# Patient Record
Sex: FEMALE | Race: ASIAN | NOT HISPANIC OR LATINO
[De-identification: names, ages, dates, MRNs, and addresses within clinical notes are randomized per-mention and may not be internally consistent; named-entity substitution may affect disease eponyms.]

---

## 2024-08-20 ENCOUNTER — APPOINTMENT (OUTPATIENT)
Dept: UROLOGY | Facility: CLINIC | Age: 49
End: 2024-08-20
Payer: MEDICAID

## 2024-08-20 VITALS
HEART RATE: 66 BPM | OXYGEN SATURATION: 99 % | BODY MASS INDEX: 22.99 KG/M2 | WEIGHT: 133 LBS | RESPIRATION RATE: 18 BRPM | TEMPERATURE: 97.7 F | SYSTOLIC BLOOD PRESSURE: 103 MMHG | HEIGHT: 63.78 IN | DIASTOLIC BLOOD PRESSURE: 68 MMHG

## 2024-08-20 DIAGNOSIS — Z00.00 ENCOUNTER FOR GENERAL ADULT MEDICAL EXAMINATION W/OUT ABNORMAL FINDINGS: ICD-10-CM

## 2024-08-20 DIAGNOSIS — R33.9 RETENTION OF URINE, UNSPECIFIED: ICD-10-CM

## 2024-08-20 DIAGNOSIS — Z78.9 OTHER SPECIFIED HEALTH STATUS: ICD-10-CM

## 2024-08-20 PROCEDURE — G2211 COMPLEX E/M VISIT ADD ON: CPT | Mod: NC

## 2024-08-20 PROCEDURE — 99204 OFFICE O/P NEW MOD 45 MIN: CPT

## 2024-08-20 RX ORDER — OXYBUTYNIN CHLORIDE 5 MG/1
5 TABLET, EXTENDED RELEASE ORAL
Qty: 30 | Refills: 3 | Status: ACTIVE | COMMUNITY
Start: 2024-08-20 | End: 1900-01-01

## 2024-08-20 NOTE — ADDENDUM
[FreeTextEntry1] : Entered by Ryan Sanford, acting as scribe for Dr. Sher Best. The documentation recorded by the scribe accurately reflects the service I personally performed and the decisions made by me.

## 2024-08-20 NOTE — LETTER BODY
[FreeTextEntry1] : Alonso Ortiz -[address], [place] [address] [phone]  Dear Dr. Ortiz,   Reason for visit: Urinary frequency.   This is a 49 year-old woman with urinary frequency and urgency referred for evaluation. Patient reports urinary frequency and urgency every 1-2 hours. She denies any gross hematuria, dysuria or urinary incontinence. The patient does not smoke. Her symptoms are aggravated by hydration. She has occasional nocturia. The patient denies any relieving factors. The patient denies any interference of function. The patient is entirely asymptomatic. She denies any history of glaucoma. All other review of systems are negative. She has no cancer in her family medical history. She has no previous surgical history. Past medical history, family history and social history were inquired and were noncontributory to current condition. The patient does not use tobacco or drink alcohol. Medications and allergies were reviewed. She has no known allergies to medication.   On examination, the patient is a well-appearing woman in no acute distress. She is alert and oriented and follows commands. She has normal mood and affect. She is normocephalic. Neck is supple. Respirations are unlabored. Abdomen is soft and nontender. Bladder is nonpalpable. No CVA tenderness. Neurologically she is grossly intact. No peripheral edema. Skin without gross abnormality.   ASSESSMENT: Urinary frequency.   I counseled the patient. I discussed the various etiologies of urinary frequency. I discussed the management options of the will to the patient. Given the moderate symptoms, I recommend that the patient begin a trial of Oxybutynin 5 mg. She denies any history of glaucoma. I discussed the potential side effects of the medication. I counseled the patient on its use and side effects. If the patient develops any side effects, the patient will discontinue the medication and contact me.   I recommended the patient obtain urinalysis and urine culture to look for infections. Risks and alternatives were discussed. I answered the patient's questions. The patient will follow-up as directed and will contact me with any questions or concerns. Thank you for the opportunity to participate in the care of Ms. TATE. I will keep you updated on her progress.   PLAN: Trial of Oxybutynin. Urinalysis. Urine culture. Follow up in one month.

## 2024-08-21 LAB
APPEARANCE: CLEAR
BACTERIA UR CULT: NORMAL
BACTERIA: NEGATIVE /HPF
BILIRUBIN URINE: NEGATIVE
BLOOD URINE: NEGATIVE
CAST: 0 /LPF
COLOR: YELLOW
EPITHELIAL CELLS: 1 /HPF
GLUCOSE QUALITATIVE U: NEGATIVE MG/DL
KETONES URINE: NEGATIVE MG/DL
LEUKOCYTE ESTERASE URINE: NEGATIVE
MICROSCOPIC-UA: NORMAL
NITRITE URINE: NEGATIVE
PH URINE: 5.5
PROTEIN URINE: NEGATIVE MG/DL
RED BLOOD CELLS URINE: 0 /HPF
SPECIFIC GRAVITY URINE: 1.02
UROBILINOGEN URINE: 0.2 MG/DL
WHITE BLOOD CELLS URINE: 0 /HPF

## 2024-09-23 ENCOUNTER — NON-APPOINTMENT (OUTPATIENT)
Age: 49
End: 2024-09-23

## 2024-09-24 ENCOUNTER — APPOINTMENT (OUTPATIENT)
Dept: UROLOGY | Facility: CLINIC | Age: 49
End: 2024-09-24
Payer: MEDICAID

## 2024-09-24 VITALS
OXYGEN SATURATION: 98 % | RESPIRATION RATE: 18 BRPM | SYSTOLIC BLOOD PRESSURE: 103 MMHG | DIASTOLIC BLOOD PRESSURE: 71 MMHG | WEIGHT: 135 LBS | BODY MASS INDEX: 23.33 KG/M2 | HEART RATE: 72 BPM | TEMPERATURE: 97.5 F

## 2024-09-24 DIAGNOSIS — R33.9 RETENTION OF URINE, UNSPECIFIED: ICD-10-CM

## 2024-09-24 DIAGNOSIS — R35.0 FREQUENCY OF MICTURITION: ICD-10-CM

## 2024-09-24 PROCEDURE — 99213 OFFICE O/P EST LOW 20 MIN: CPT

## 2024-09-24 PROCEDURE — G2211 COMPLEX E/M VISIT ADD ON: CPT | Mod: NC

## 2024-09-24 NOTE — HISTORY OF PRESENT ILLNESS
[FreeTextEntry1] : Follow-up for urinary frequency and urgency, trail Oxybutynin 5mg daily, reports unbale to empty bladder completely, PVR 63ml and 8ml after double voiding  Please refer to URO Consult Note.

## 2024-09-24 NOTE — LETTER BODY
[FreeTextEntry1] : Al[facility] Ortiz -[address], [place] [address] [phone]  Dear Dr. Ortiz,   Reason for visit: Urinary frequency. Mild right flank pain.  This is a 49 year-old woman with urinary frequency and urgency. Patient returns today for follow-up.  Patient reports taking Oxybutynin 5 mg regularly with no side effects or difficulties. She reports improved urinary symptoms with medication. She denies any gross hematuria, dysuria or urinary incontinence. The patient does not smoke. Her symptoms are aggravated by hydration. She has occasional nocturia. The patient denies any relieving factors. The patient denies any interference of function. The patient is entirely asymptomatic. She denies any history of glaucoma. Patient also complains of mild right flank pain. All other review of systems are negative. She has no cancer in her family medical history. She has no previous surgical history. Past medical history, family history and social history were inquired and were noncontributory to current condition. The patient does not use tobacco or drink alcohol. Medications and allergies were reviewed. She has no known allergies to medication.  On examination, the patient is a well-appearing woman in no acute distress. She is alert and oriented and follows commands. She has normal mood and affect. She is normocephalic. Neck is supple. Respirations are unlabored. Abdomen is soft and nontender. Bladder is nonpalpable. No CVA tenderness. Neurologically she is grossly intact. No peripheral edema. Skin without gross abnormality.   Her urinalysis was unremarkable. Her urine culture was negative for infection.    ASSESSMENT: Urinary frequency. Mild right flank pain.  I counseled the patient. In terms of her urinary frequency, the patient reports improved symptoms with medical therapy. She will continue to take Oxybutynin.  I renewed the patient's prescription for Oxybutynin today. I encouraged the patient to continue medications regularly as directed. In terms of her right flank pain, I recommended the patient obtain renal ultrasound to ensure stability. I counseled the patient regarding the procedure. The risks and benefits were discussed. Alternatives were given. I answered the patient's questions. The patient will take the necessary preparations for the procedure. The patient will follow-up as directed and will contact me with any questions or concerns. Thank you for the opportunity to participate in the care of Ms. TATE. I will keep you updated on her progress.  PLAN: Continue Oxybutynin. Renal ultrasound. Follow up as directed.

## 2024-09-25 LAB
APPEARANCE: CLEAR
BACTERIA: NEGATIVE /HPF
BILIRUBIN URINE: NEGATIVE
BLOOD URINE: NEGATIVE
CAST: 0 /LPF
COLOR: YELLOW
EPITHELIAL CELLS: 1 /HPF
GLUCOSE QUALITATIVE U: NEGATIVE MG/DL
KETONES URINE: NEGATIVE MG/DL
LEUKOCYTE ESTERASE URINE: NEGATIVE
MICROSCOPIC-UA: NORMAL
NITRITE URINE: NEGATIVE
PH URINE: 6.5
PROTEIN URINE: NEGATIVE MG/DL
RED BLOOD CELLS URINE: 0 /HPF
SPECIFIC GRAVITY URINE: 1.01
UROBILINOGEN URINE: 0.2 MG/DL
WHITE BLOOD CELLS URINE: 0 /HPF

## 2024-09-26 LAB — BACTERIA UR CULT: NORMAL

## 2024-10-01 ENCOUNTER — APPOINTMENT (OUTPATIENT)
Dept: UROLOGY | Facility: CLINIC | Age: 49
End: 2024-10-01
Payer: MEDICAID

## 2024-10-01 PROCEDURE — 99214 OFFICE O/P EST MOD 30 MIN: CPT | Mod: 25

## 2024-10-01 PROCEDURE — 76775 US EXAM ABDO BACK WALL LIM: CPT

## 2024-10-01 NOTE — HISTORY OF PRESENT ILLNESS
[FreeTextEntry1] : Please refer to URO Consult Note. Oriented - self; Oriented - place; Oriented - time

## 2024-10-01 NOTE — LETTER BODY
[FreeTextEntry1] : Al[facility] Ortiz -[address], [place] [address] [phone]  Dear Dr. Ortiz,   Reason for visit: Urinary frequency. Mild right flank pain.  This is a 49 year-old woman with urinary frequency and right flank pain. Patient returns today for follow-up and ultrasound. Her renal ultrasound today was negative for any stones or hydronephrosis. Since her last visit, she reports taking Oxybutynin 5 mg regularly with no side effects or difficulties. She reports improved urinary symptoms with medication. She denies any gross hematuria, dysuria or urinary incontinence. The patient does not smoke. Her symptoms are aggravated by hydration. She has occasional nocturia. The patient denies any relieving factors. The patient denies any interference of function. The patient is entirely asymptomatic. She denies any history of glaucoma. Patient also complains of mild right flank pain. All other review of systems are negative. She has no cancer in her family medical history. She has no previous surgical history. Past medical history, family history and social history were inquired and were noncontributory to current condition. The patient does not use tobacco or drink alcohol. Medications and allergies were reviewed. She has no known allergies to medication.  On examination, the patient is a well-appearing woman in no acute distress. She is alert and oriented and follows commands. She has normal mood and affect. She is normocephalic. Neck is supple. Respirations are unlabored. Abdomen is soft and nontender. Bladder is nonpalpable. No CVA tenderness. Neurologically she is grossly intact. No peripheral edema. Skin without gross abnormality.   Her urinalysis was unremarkable. Her urine culture was negative for infection.   I personally reviewed ultrasound images with the patient today. Images demonstrated no evidence of stones, solid masses or hydronephrosis.  ASSESSMENT: Urinary frequency. Mild right flank pain.  I counseled the patient. In terms of her urinary frequency, the patient reports improved symptoms with medical therapy. She will continue to take Oxybutynin. I renewed the patient's prescription for Oxybutynin today. I encouraged the patient to continue medications regularly as directed. In terms of her right flank pain, her ultrasound today was negative for any stones or hydronephrosis. I reassured the patient.  Risks and alternatives were discussed. I answered the patient's questions. The patient will follow-up as directed and will contact me with any questions or concerns. Thank you for the opportunity to participate in the care of Ms. TATE. I will keep you updated on her progress.  PLAN: Continue Oxybutynin. Follow up in 1 year.  I spent 30-minutes time today on all issues related to this patient on today date of service including non face to face time.